# Patient Record
Sex: FEMALE | URBAN - METROPOLITAN AREA
[De-identification: names, ages, dates, MRNs, and addresses within clinical notes are randomized per-mention and may not be internally consistent; named-entity substitution may affect disease eponyms.]

---

## 2023-03-16 ENCOUNTER — LAB OUTSIDE AN ENCOUNTER (OUTPATIENT)
Dept: URBAN - METROPOLITAN AREA CLINIC 98 | Facility: CLINIC | Age: 48
End: 2023-03-16

## 2023-03-16 ENCOUNTER — TELEPHONE ENCOUNTER (OUTPATIENT)
Dept: URBAN - METROPOLITAN AREA CLINIC 35 | Facility: CLINIC | Age: 48
End: 2023-03-16

## 2023-03-16 ENCOUNTER — OFFICE VISIT (OUTPATIENT)
Dept: URBAN - METROPOLITAN AREA CLINIC 98 | Facility: CLINIC | Age: 48
End: 2023-03-16
Payer: COMMERCIAL

## 2023-03-16 ENCOUNTER — WEB ENCOUNTER (OUTPATIENT)
Dept: URBAN - METROPOLITAN AREA CLINIC 98 | Facility: CLINIC | Age: 48
End: 2023-03-16

## 2023-03-16 VITALS
BODY MASS INDEX: 29.66 KG/M2 | DIASTOLIC BLOOD PRESSURE: 73 MMHG | WEIGHT: 167.4 LBS | TEMPERATURE: 97.6 F | HEIGHT: 63 IN | SYSTOLIC BLOOD PRESSURE: 113 MMHG | HEART RATE: 78 BPM

## 2023-03-16 DIAGNOSIS — K21.9 GASTROESOPHAGEAL REFLUX DISEASE, UNSPECIFIED WHETHER ESOPHAGITIS PRESENT: ICD-10-CM

## 2023-03-16 DIAGNOSIS — R13.19 CERVICAL DYSPHAGIA: ICD-10-CM

## 2023-03-16 PROBLEM — 40739000: Status: ACTIVE | Noted: 2023-03-16

## 2023-03-16 PROBLEM — 235595009: Status: ACTIVE | Noted: 2023-03-16

## 2023-03-16 PROCEDURE — 99204 OFFICE O/P NEW MOD 45 MIN: CPT | Performed by: INTERNAL MEDICINE

## 2023-03-16 RX ORDER — PANTOPRAZOLE SODIUM 40 MG/1
1 TABLET TABLET, DELAYED RELEASE ORAL ONCE A DAY
Qty: 30 | Refills: 3 | OUTPATIENT
Start: 2023-03-16

## 2023-03-16 NOTE — HPI-TODAY'S VISIT:
Patient is a 47 year old female who presents to discuss dysphagia for the past 3 months  Visit is completed using   She reports she is having food "get stuck" in her esophagus  When she is eating, she is having to drink a sip of water with every bite  Patient endorses it is all foods that she is having difficulty with other than liquids  Denies choking episodes  Denies vomiting  She is having heartburn with spicy foods specifically She takes pepto as needed which causes mild improvement  Denies melena

## 2023-04-04 ENCOUNTER — TELEPHONE ENCOUNTER (OUTPATIENT)
Dept: URBAN - METROPOLITAN AREA CLINIC 35 | Facility: CLINIC | Age: 48
End: 2023-04-04

## 2023-04-04 ENCOUNTER — OFFICE VISIT (OUTPATIENT)
Dept: URBAN - METROPOLITAN AREA SURGERY CENTER 18 | Facility: SURGERY CENTER | Age: 48
End: 2023-04-04
Payer: COMMERCIAL

## 2023-04-04 DIAGNOSIS — K22.89 DILATATION OF ESOPHAGUS: ICD-10-CM

## 2023-04-04 DIAGNOSIS — K29.60 ADENOPAPILLOMATOSIS GASTRICA: ICD-10-CM

## 2023-04-04 PROCEDURE — G8907 PT DOC NO EVENTS ON DISCHARG: HCPCS | Performed by: INTERNAL MEDICINE

## 2023-04-04 PROCEDURE — 43239 EGD BIOPSY SINGLE/MULTIPLE: CPT | Performed by: INTERNAL MEDICINE

## 2023-04-04 RX ORDER — PANTOPRAZOLE SODIUM 40 MG/1
1 TABLET TABLET, DELAYED RELEASE ORAL ONCE A DAY
Qty: 30 | Refills: 3 | Status: ACTIVE | COMMUNITY
Start: 2023-03-16

## 2023-04-04 RX ORDER — PANTOPRAZOLE SODIUM 40 MG/1
1 TABLET TABLET, DELAYED RELEASE ORAL TWICE A DAY
Qty: 60 TABLET | Refills: 3 | OUTPATIENT
Start: 2023-04-04

## 2023-04-11 ENCOUNTER — TELEPHONE ENCOUNTER (OUTPATIENT)
Dept: URBAN - METROPOLITAN AREA CLINIC 35 | Facility: CLINIC | Age: 48
End: 2023-04-11

## 2023-04-11 RX ORDER — PANTOPRAZOLE SODIUM 40 MG/1
1 TABLET TABLET, DELAYED RELEASE ORAL ONCE A DAY
Qty: 30 | Refills: 3 | OUTPATIENT
Start: 2023-04-11

## 2023-08-02 ENCOUNTER — WEB ENCOUNTER (OUTPATIENT)
Dept: URBAN - METROPOLITAN AREA CLINIC 98 | Facility: CLINIC | Age: 48
End: 2023-08-02

## 2023-08-02 ENCOUNTER — OFFICE VISIT (OUTPATIENT)
Dept: URBAN - METROPOLITAN AREA CLINIC 98 | Facility: CLINIC | Age: 48
End: 2023-08-02
Payer: COMMERCIAL

## 2023-08-02 VITALS
BODY MASS INDEX: 30.58 KG/M2 | DIASTOLIC BLOOD PRESSURE: 71 MMHG | TEMPERATURE: 98.2 F | WEIGHT: 172.6 LBS | HEART RATE: 82 BPM | SYSTOLIC BLOOD PRESSURE: 108 MMHG | HEIGHT: 63 IN

## 2023-08-02 DIAGNOSIS — K21.00 GASTROESOPHAGEAL REFLUX DISEASE WITH ESOPHAGITIS WITHOUT HEMORRHAGE: ICD-10-CM

## 2023-08-02 DIAGNOSIS — E66.9 OBESITY (BMI 30.0-34.9): ICD-10-CM

## 2023-08-02 PROBLEM — 443371000124107: Status: ACTIVE | Noted: 2023-08-02

## 2023-08-02 PROCEDURE — 99214 OFFICE O/P EST MOD 30 MIN: CPT | Performed by: INTERNAL MEDICINE

## 2023-08-02 RX ORDER — PANTOPRAZOLE SODIUM 40 MG/1
1 TABLET TABLET, DELAYED RELEASE ORAL ONCE A DAY
Qty: 30 | Refills: 3 | Status: ACTIVE | COMMUNITY
Start: 2023-04-11

## 2023-08-02 NOTE — HPI-TODAY'S VISIT:
48 yo pt w  chronic GERD here for follow up. EGD 4/23: LA Grade C reflux esophagitis, sm HH, chronic Hp-negative inactive gastritis and normal duodenal bx. Has been on Pantoprazole 40 mg po qd ac dinner, w improvement in sxs but unresolved. Some p-prandial subxiphoid pressure wo dysphagia / odynophagia. No epigastric pain and denies gastroparetic sxs.Occasional breakthrough of nocturnal MANNY, wo extraesophageal MANNY sxs. Normal bm's and no bleeding. Weight stable. No other complaints.

## 2023-08-14 ENCOUNTER — TELEPHONE ENCOUNTER (OUTPATIENT)
Dept: URBAN - METROPOLITAN AREA CLINIC 98 | Facility: CLINIC | Age: 48
End: 2023-08-14

## 2023-12-14 ENCOUNTER — CLAIMS CREATED FROM THE CLAIM WINDOW (OUTPATIENT)
Dept: URBAN - METROPOLITAN AREA CLINIC 98 | Facility: CLINIC | Age: 48
End: 2023-12-14
Payer: COMMERCIAL

## 2023-12-14 VITALS
TEMPERATURE: 97.5 F | SYSTOLIC BLOOD PRESSURE: 106 MMHG | DIASTOLIC BLOOD PRESSURE: 70 MMHG | BODY MASS INDEX: 30.09 KG/M2 | HEIGHT: 63 IN | HEART RATE: 71 BPM | WEIGHT: 169.8 LBS

## 2023-12-14 DIAGNOSIS — K76.0 NAFLD (NONALCOHOLIC FATTY LIVER DISEASE): ICD-10-CM

## 2023-12-14 DIAGNOSIS — K21.00 ALKALINE REFLUX ESOPHAGITIS: ICD-10-CM

## 2023-12-14 DIAGNOSIS — K82.8 GALLBLADDER SLUDGE: ICD-10-CM

## 2023-12-14 DIAGNOSIS — K21.00 GASTROESOPHAGEAL REFLUX DISEASE WITH ESOPHAGITIS WITHOUT HEMORRHAGE: ICD-10-CM

## 2023-12-14 DIAGNOSIS — E66.9 OBESITY (BMI 30.0-34.9): ICD-10-CM

## 2023-12-14 PROCEDURE — 99214 OFFICE O/P EST MOD 30 MIN: CPT | Performed by: INTERNAL MEDICINE

## 2023-12-14 PROCEDURE — 99244 OFF/OP CNSLTJ NEW/EST MOD 40: CPT | Performed by: INTERNAL MEDICINE

## 2023-12-14 RX ORDER — PANTOPRAZOLE SODIUM 40 MG/1
1 TABLET TABLET, DELAYED RELEASE ORAL ONCE A DAY
Qty: 30 | Refills: 3 | Status: ACTIVE | COMMUNITY
Start: 2023-04-11

## 2023-12-14 RX ORDER — PANTOPRAZOLE SODIUM 40 MG/1
1 TABLET TABLET, DELAYED RELEASE ORAL TWICE A DAY
Qty: 180 TABLET | Refills: 3 | OUTPATIENT
Start: 2023-12-14

## 2023-12-14 NOTE — HPI-TODAY'S VISIT:
49 yo pt w  chronic GERD here for follow up. GERD sxs about the same wo alarm nor extraesophageal; diet-triggered breakthrough on Pantoprazole. Seen by her PCP: RUQ-US: diffuse fatty liver wo parenchymal lesions, GB sludge wo gallstones and no inflammation nor obstruction. Had Pelvic US 10/23: lg fibroid and R-ovarian cyst. Abdominal US 10/23: again diffuse fatty liver, liver 15.2 cm, normal bile system, GB sludge wo gallstones, nor pericholecystic fluid nor inflammation and no radiologic or clinical evidence of PHTN. No recent labs. EGD 4/23: LA Grade C reflux esophagitis, sm HH, chronic Hp-negative inactive gastritis and normal duodenal bx. Has been on Pantoprazole 40 mg po qd ac dinner, w improvement in sxs but unresolved. Some p-prandial subxiphoid pressure wo dysphagia / odynophagia. No epigastric pain and denies gastroparetic sxs.Occasional breakthrough of nocturnal MANNY, wo extraesophageal MANNY sxs. Normal bm's and no bleeding. Weight stable. No other complaints.

## 2023-12-15 PROBLEM — 1231824009: Status: ACTIVE | Noted: 2023-12-14

## 2023-12-15 PROBLEM — 266433003: Status: ACTIVE | Noted: 2023-08-02

## 2023-12-16 LAB
A/G RATIO: 1.2
ALBUMIN: 4.1
ALKALINE PHOSPHATASE: 80
ALT (SGPT): 40
ALT: 40
AST (SGOT): 32
AST: 32
BILIRUBIN, TOTAL: 0.3
BUN/CREATININE RATIO: (no result)
BUN: 15
CALCIUM: 9.3
CARBON DIOXIDE, TOTAL: 23
CHLORIDE: 104
CHOL/HDLC RATIO: 2
CHOLESTEROL, TOTAL: 143
CREATININE: 0.51
EGFR: 117
FIB 4 INDEX: 0.62
FIB 4 INTERPRETATION: (no result)
GLOBULIN, TOTAL: 3.3
GLUCOSE: 88
HDL CHOLESTEROL: 71
HEMOGLOBIN A1C: 5.7
LDL-CHOLESTEROL: 53
NON HDL CHOLESTEROL: 72
PLATELET COUNT: 378
POTASSIUM: 4.4
PROTEIN, TOTAL: 7.4
SODIUM: 137
TRIGLYCERIDES: 108

## 2024-02-08 ENCOUNTER — OV EP (OUTPATIENT)
Dept: URBAN - METROPOLITAN AREA CLINIC 98 | Facility: CLINIC | Age: 49
End: 2024-02-08

## 2024-03-26 ENCOUNTER — OV EP (OUTPATIENT)
Dept: URBAN - METROPOLITAN AREA CLINIC 98 | Facility: CLINIC | Age: 49
End: 2024-03-26
Payer: COMMERCIAL

## 2024-03-26 ENCOUNTER — LAB (OUTPATIENT)
Dept: URBAN - METROPOLITAN AREA CLINIC 98 | Facility: CLINIC | Age: 49
End: 2024-03-26

## 2024-03-26 DIAGNOSIS — K82.8 GALLBLADDER SLUDGE: ICD-10-CM

## 2024-03-26 DIAGNOSIS — K76.0 NAFLD (NONALCOHOLIC FATTY LIVER DISEASE): ICD-10-CM

## 2024-03-26 DIAGNOSIS — L71.9 ACNE ROSACEA: ICD-10-CM

## 2024-03-26 DIAGNOSIS — E66.9 OBESITY (BMI 30-39.9): ICD-10-CM

## 2024-03-26 DIAGNOSIS — K21.9 GERD WITHOUT ESOPHAGITIS: ICD-10-CM

## 2024-03-26 DIAGNOSIS — Z12.11 COLON CANCER SCREENING: ICD-10-CM

## 2024-03-26 PROCEDURE — 99214 OFFICE O/P EST MOD 30 MIN: CPT | Performed by: INTERNAL MEDICINE

## 2024-03-26 RX ORDER — SODIUM, POTASSIUM,MAG SULFATES 17.5-3.13G
354ML SOLUTION, RECONSTITUTED, ORAL ORAL
Qty: 345 MILLILITER | Refills: 0 | OUTPATIENT
Start: 2024-03-27 | End: 2024-03-28

## 2024-03-26 RX ORDER — PANTOPRAZOLE SODIUM 40 MG/1
1 TABLET TABLET, DELAYED RELEASE ORAL ONCE A DAY
Qty: 30 | Refills: 3 | Status: ACTIVE | COMMUNITY
Start: 2023-04-11

## 2024-03-26 RX ORDER — PANTOPRAZOLE SODIUM 40 MG/1
1 TABLET TABLET, DELAYED RELEASE ORAL TWICE A DAY
Qty: 180 TABLET | Refills: 3 | Status: ACTIVE | COMMUNITY
Start: 2023-12-14

## 2024-03-26 NOTE — HPI-TODAY'S VISIT:
47 yo pt w  chronic GERD here for follow up. GERD sxs better controlled w anti-reflux diet and Pantoprazole 40 mg po bid. No alarm nor extraesophageal sxs. She has developed few non-pruritic telangiectasias in both hands wo other sxs. RUQ-US: diffuse fatty liver wo parenchymal lesions, GB sludge wo gallstones and no inflammation nor obstruction. Had Pelvic US 10/23: lg fibroid and R-ovarian cyst. Abdominal US 10/23: again diffuse fatty liver, liver 15.2 cm, normal bile system, GB sludge wo gallstones, nor pericholecystic fluid nor inflammation and no radiologic or clinical evidence of PHTN. No recent labs. EGD 4/23: LA Grade C reflux esophagitis, sm HH, chronic Hp-negative inactive gastritis and normal duodenal bx. Has been on Pantoprazole 40 mg po qd ac dinner, w improvement in sxs but unresolved. P-prandial subxiphoid pressure wo dysphagia / odynophagia is resolving.No epigastric pain and denies gastroparetic sxs.Occasional breakthrough of nocturnal MANNY, wo extraesophageal MANNY sxs. Normal bm's and no bleeding. Weight stable. No other complaints.

## 2024-03-26 NOTE — PHYSICAL EXAM SKIN:
no rashes , no jaundice present , good turgor , no masses , no tenderness on palpation. Few telangiectasias in both hands, single one in hard palate and sublingual, wo bleeding.

## 2024-03-27 ENCOUNTER — LAB (OUTPATIENT)
Dept: URBAN - METROPOLITAN AREA CLINIC 98 | Facility: CLINIC | Age: 49
End: 2024-03-27

## 2024-03-27 PROBLEM — 162864005: Status: ACTIVE | Noted: 2024-03-27

## 2024-03-27 PROBLEM — 398909004: Status: ACTIVE | Noted: 2024-03-27

## 2024-03-27 PROBLEM — 266435005: Status: ACTIVE | Noted: 2024-03-27

## 2024-03-28 ENCOUNTER — COLON (OUTPATIENT)
Dept: URBAN - METROPOLITAN AREA SURGERY CENTER 18 | Facility: SURGERY CENTER | Age: 49
End: 2024-03-28

## 2024-03-28 RX ORDER — PANTOPRAZOLE SODIUM 40 MG/1
1 TABLET TABLET, DELAYED RELEASE ORAL ONCE A DAY
Qty: 30 | Refills: 3 | Status: ACTIVE | COMMUNITY
Start: 2023-04-11

## 2024-03-28 RX ORDER — SODIUM, POTASSIUM,MAG SULFATES 17.5-3.13G
354ML SOLUTION, RECONSTITUTED, ORAL ORAL
Qty: 345 MILLILITER | Refills: 0 | Status: ACTIVE | COMMUNITY
Start: 2024-03-27 | End: 2024-03-28

## 2024-03-28 RX ORDER — PANTOPRAZOLE SODIUM 40 MG/1
1 TABLET TABLET, DELAYED RELEASE ORAL TWICE A DAY
Qty: 180 TABLET | Refills: 3 | Status: ACTIVE | COMMUNITY
Start: 2023-12-14